# Patient Record
Sex: MALE | Race: WHITE | Employment: FULL TIME | URBAN - METROPOLITAN AREA
[De-identification: names, ages, dates, MRNs, and addresses within clinical notes are randomized per-mention and may not be internally consistent; named-entity substitution may affect disease eponyms.]

---

## 2021-03-10 ENCOUNTER — TRANSCRIBE ORDER (OUTPATIENT)
Dept: REGISTRATION | Age: 71
End: 2021-03-10

## 2021-03-10 ENCOUNTER — HOSPITAL ENCOUNTER (OUTPATIENT)
Dept: PREADMISSION TESTING | Age: 71
Discharge: HOME | End: 2021-03-10
Payer: MEDICARE

## 2021-03-10 DIAGNOSIS — N13.8 ENLARGED PROSTATE WITH URINARY OBSTRUCTION: Primary | ICD-10-CM

## 2021-03-10 DIAGNOSIS — N13.8 ENLARGED PROSTATE WITH URINARY OBSTRUCTION: ICD-10-CM

## 2021-03-10 DIAGNOSIS — N40.1 ENLARGED PROSTATE WITH URINARY OBSTRUCTION: ICD-10-CM

## 2021-03-10 DIAGNOSIS — N40.1 ENLARGED PROSTATE WITH URINARY OBSTRUCTION: Primary | ICD-10-CM

## 2021-03-10 LAB
ANION GAP SERPL CALC-SCNC: 4 MMOL/L (ref 3–18)
ATRIAL RATE: 52 BPM
BASOPHILS # BLD: 0 K/UL (ref 0–0.1)
BASOPHILS NFR BLD: 0 % (ref 0–2)
BUN SERPL-MCNC: 30 MG/DL (ref 7–18)
BUN/CREAT SERPL: 15 (ref 12–20)
CALCIUM SERPL-MCNC: 9.1 MG/DL (ref 8.5–10.1)
CALCULATED P AXIS, ECG09: 75 DEGREES
CALCULATED R AXIS, ECG10: -18 DEGREES
CALCULATED T AXIS, ECG11: 42 DEGREES
CHLORIDE SERPL-SCNC: 98 MMOL/L (ref 100–111)
CO2 SERPL-SCNC: 35 MMOL/L (ref 21–32)
CREAT SERPL-MCNC: 2.02 MG/DL (ref 0.6–1.3)
DIAGNOSIS, 93000: NORMAL
DIFFERENTIAL METHOD BLD: ABNORMAL
EOSINOPHIL # BLD: 0.1 K/UL (ref 0–0.4)
EOSINOPHIL NFR BLD: 1 % (ref 0–5)
ERYTHROCYTE [DISTWIDTH] IN BLOOD BY AUTOMATED COUNT: 14.6 % (ref 11.6–14.5)
GLUCOSE SERPL-MCNC: 73 MG/DL (ref 74–99)
HCT VFR BLD AUTO: 48.2 % (ref 36–48)
HGB BLD-MCNC: 15.7 G/DL (ref 13–16)
LYMPHOCYTES # BLD: 1.1 K/UL (ref 0.9–3.6)
LYMPHOCYTES NFR BLD: 20 % (ref 21–52)
MCH RBC QN AUTO: 32.6 PG (ref 24–34)
MCHC RBC AUTO-ENTMCNC: 32.6 G/DL (ref 31–37)
MCV RBC AUTO: 100.2 FL (ref 74–97)
MONOCYTES # BLD: 0.9 K/UL (ref 0.05–1.2)
MONOCYTES NFR BLD: 15 % (ref 3–10)
NEUTS SEG # BLD: 3.6 K/UL (ref 1.8–8)
NEUTS SEG NFR BLD: 64 % (ref 40–73)
P-R INTERVAL, ECG05: 178 MS
PLATELET # BLD AUTO: 201 K/UL (ref 135–420)
PMV BLD AUTO: 11.4 FL (ref 9.2–11.8)
POTASSIUM SERPL-SCNC: 3.9 MMOL/L (ref 3.5–5.5)
Q-T INTERVAL, ECG07: 396 MS
QRS DURATION, ECG06: 98 MS
QTC CALCULATION (BEZET), ECG08: 401 MS
RBC # BLD AUTO: 4.81 M/UL (ref 4.7–5.5)
SODIUM SERPL-SCNC: 137 MMOL/L (ref 136–145)
VENTRICULAR RATE, ECG03: 62 BPM
WBC # BLD AUTO: 5.7 K/UL (ref 4.6–13.2)

## 2021-03-10 PROCEDURE — U0003 INFECTIOUS AGENT DETECTION BY NUCLEIC ACID (DNA OR RNA); SEVERE ACUTE RESPIRATORY SYNDROME CORONAVIRUS 2 (SARS-COV-2) (CORONAVIRUS DISEASE [COVID-19]), AMPLIFIED PROBE TECHNIQUE, MAKING USE OF HIGH THROUGHPUT TECHNOLOGIES AS DESCRIBED BY CMS-2020-01-R: HCPCS

## 2021-03-10 PROCEDURE — 80048 BASIC METABOLIC PNL TOTAL CA: CPT

## 2021-03-10 PROCEDURE — 85025 COMPLETE CBC W/AUTO DIFF WBC: CPT

## 2021-03-10 PROCEDURE — 36415 COLL VENOUS BLD VENIPUNCTURE: CPT

## 2021-03-10 PROCEDURE — 93005 ELECTROCARDIOGRAM TRACING: CPT

## 2021-03-12 LAB — SARS-COV-2, NAA: NOT DETECTED

## 2021-03-16 ENCOUNTER — ANESTHESIA (OUTPATIENT)
Dept: SURGERY | Age: 71
End: 2021-03-16
Payer: MEDICARE

## 2021-03-16 ENCOUNTER — HOSPITAL ENCOUNTER (OUTPATIENT)
Age: 71
Setting detail: OUTPATIENT SURGERY
Discharge: HOME | End: 2021-03-16
Attending: UROLOGY | Admitting: UROLOGY
Payer: MEDICARE

## 2021-03-16 ENCOUNTER — ANESTHESIA EVENT (OUTPATIENT)
Dept: SURGERY | Age: 71
End: 2021-03-16
Payer: MEDICARE

## 2021-03-16 VITALS
WEIGHT: 178.13 LBS | OXYGEN SATURATION: 100 % | HEART RATE: 60 BPM | BODY MASS INDEX: 24.94 KG/M2 | HEIGHT: 71 IN | RESPIRATION RATE: 16 BRPM | TEMPERATURE: 98.2 F | SYSTOLIC BLOOD PRESSURE: 109 MMHG | DIASTOLIC BLOOD PRESSURE: 61 MMHG

## 2021-03-16 DIAGNOSIS — N13.8 ENLARGED PROSTATE WITH URINARY OBSTRUCTION: Primary | ICD-10-CM

## 2021-03-16 DIAGNOSIS — N40.1 ENLARGED PROSTATE WITH URINARY OBSTRUCTION: Primary | ICD-10-CM

## 2021-03-16 PROCEDURE — 77030040361 HC SLV COMPR DVT MDII -B: Performed by: UROLOGY

## 2021-03-16 PROCEDURE — 76210000026 HC REC RM PH II 1 TO 1.5 HR: Performed by: UROLOGY

## 2021-03-16 PROCEDURE — 74011000250 HC RX REV CODE- 250: Performed by: NURSE ANESTHETIST, CERTIFIED REGISTERED

## 2021-03-16 PROCEDURE — 77030034696 HC CATH URETH FOL 2W BARD -A: Performed by: UROLOGY

## 2021-03-16 PROCEDURE — 76060000031 HC ANESTHESIA FIRST 0.5 HR: Performed by: UROLOGY

## 2021-03-16 PROCEDURE — 74011250636 HC RX REV CODE- 250/636: Performed by: NURSE ANESTHETIST, CERTIFIED REGISTERED

## 2021-03-16 PROCEDURE — 74011250636 HC RX REV CODE- 250/636: Performed by: UROLOGY

## 2021-03-16 PROCEDURE — 77030020782 HC GWN BAIR PAWS FLX 3M -B: Performed by: UROLOGY

## 2021-03-16 PROCEDURE — 76210000063 HC OR PH I REC FIRST 0.5 HR: Performed by: UROLOGY

## 2021-03-16 PROCEDURE — 74011250637 HC RX REV CODE- 250/637: Performed by: ANESTHESIOLOGY

## 2021-03-16 PROCEDURE — 76010000154 HC OR TIME FIRST 0.5 HR: Performed by: UROLOGY

## 2021-03-16 PROCEDURE — 77030020268 HC MISC GENERAL SUPPLY: Performed by: UROLOGY

## 2021-03-16 PROCEDURE — 2709999900 HC NON-CHARGEABLE SUPPLY: Performed by: UROLOGY

## 2021-03-16 PROCEDURE — 74011250637 HC RX REV CODE- 250/637: Performed by: UROLOGY

## 2021-03-16 RX ORDER — SODIUM CHLORIDE, SODIUM LACTATE, POTASSIUM CHLORIDE, CALCIUM CHLORIDE 600; 310; 30; 20 MG/100ML; MG/100ML; MG/100ML; MG/100ML
125 INJECTION, SOLUTION INTRAVENOUS CONTINUOUS
Status: DISCONTINUED | OUTPATIENT
Start: 2021-03-16 | End: 2021-03-16 | Stop reason: HOSPADM

## 2021-03-16 RX ORDER — LEVOFLOXACIN 500 MG/1
500 TABLET, FILM COATED ORAL DAILY
Qty: 4 TAB | Refills: 0 | Status: SHIPPED | OUTPATIENT
Start: 2021-03-17 | End: 2021-03-21

## 2021-03-16 RX ORDER — LEVOFLOXACIN 5 MG/ML
500 INJECTION, SOLUTION INTRAVENOUS ONCE
Status: COMPLETED | OUTPATIENT
Start: 2021-03-16 | End: 2021-03-16

## 2021-03-16 RX ORDER — PROPOFOL 10 MG/ML
INJECTION, EMULSION INTRAVENOUS
Status: DISCONTINUED | OUTPATIENT
Start: 2021-03-16 | End: 2021-03-16 | Stop reason: HOSPADM

## 2021-03-16 RX ORDER — TRAMADOL HYDROCHLORIDE 50 MG/1
50 TABLET ORAL
Status: DISCONTINUED | OUTPATIENT
Start: 2021-03-16 | End: 2021-03-16 | Stop reason: HOSPADM

## 2021-03-16 RX ORDER — TRAMADOL HYDROCHLORIDE 50 MG/1
50 TABLET ORAL
Qty: 10 TAB | Refills: 0 | Status: SHIPPED | OUTPATIENT
Start: 2021-03-16 | End: 2021-03-19

## 2021-03-16 RX ORDER — LIDOCAINE HYDROCHLORIDE 20 MG/ML
INJECTION, SOLUTION EPIDURAL; INFILTRATION; INTRACAUDAL; PERINEURAL AS NEEDED
Status: DISCONTINUED | OUTPATIENT
Start: 2021-03-16 | End: 2021-03-16 | Stop reason: HOSPADM

## 2021-03-16 RX ORDER — KETAMINE HYDROCHLORIDE 10 MG/ML
INJECTION, SOLUTION INTRAMUSCULAR; INTRAVENOUS AS NEEDED
Status: DISCONTINUED | OUTPATIENT
Start: 2021-03-16 | End: 2021-03-16 | Stop reason: HOSPADM

## 2021-03-16 RX ORDER — MIDAZOLAM HYDROCHLORIDE 1 MG/ML
INJECTION, SOLUTION INTRAMUSCULAR; INTRAVENOUS AS NEEDED
Status: DISCONTINUED | OUTPATIENT
Start: 2021-03-16 | End: 2021-03-16 | Stop reason: HOSPADM

## 2021-03-16 RX ORDER — GLYCOPYRROLATE 0.2 MG/ML
INJECTION INTRAMUSCULAR; INTRAVENOUS AS NEEDED
Status: DISCONTINUED | OUTPATIENT
Start: 2021-03-16 | End: 2021-03-16 | Stop reason: HOSPADM

## 2021-03-16 RX ORDER — ATROPA BELLADONNA AND OPIUM 16.2; 3 MG/1; MG/1
SUPPOSITORY RECTAL AS NEEDED
Status: DISCONTINUED | OUTPATIENT
Start: 2021-03-16 | End: 2021-03-16 | Stop reason: HOSPADM

## 2021-03-16 RX ORDER — PROPOFOL 10 MG/ML
INJECTION, EMULSION INTRAVENOUS AS NEEDED
Status: DISCONTINUED | OUTPATIENT
Start: 2021-03-16 | End: 2021-03-16 | Stop reason: HOSPADM

## 2021-03-16 RX ORDER — ONDANSETRON 2 MG/ML
INJECTION INTRAMUSCULAR; INTRAVENOUS AS NEEDED
Status: DISCONTINUED | OUTPATIENT
Start: 2021-03-16 | End: 2021-03-16 | Stop reason: HOSPADM

## 2021-03-16 RX ADMIN — TRAMADOL HYDROCHLORIDE 50 MG: 50 TABLET, FILM COATED ORAL at 12:05

## 2021-03-16 RX ADMIN — KETAMINE HYDROCHLORIDE 10 MG: 10 INJECTION, SOLUTION INTRAMUSCULAR; INTRAVENOUS at 10:54

## 2021-03-16 RX ADMIN — PROPOFOL 50 MG: 10 INJECTION, EMULSION INTRAVENOUS at 10:56

## 2021-03-16 RX ADMIN — ONDANSETRON HYDROCHLORIDE 4 MG: 2 INJECTION INTRAMUSCULAR; INTRAVENOUS at 10:53

## 2021-03-16 RX ADMIN — PROPOFOL 50 MCG/KG/MIN: 10 INJECTION, EMULSION INTRAVENOUS at 10:49

## 2021-03-16 RX ADMIN — KETAMINE HYDROCHLORIDE 10 MG: 10 INJECTION, SOLUTION INTRAMUSCULAR; INTRAVENOUS at 10:49

## 2021-03-16 RX ADMIN — GLYCOPYRROLATE 0.2 MG: 0.2 INJECTION INTRAMUSCULAR; INTRAVENOUS at 10:52

## 2021-03-16 RX ADMIN — MIDAZOLAM 2 MG: 1 INJECTION INTRAMUSCULAR; INTRAVENOUS at 10:41

## 2021-03-16 RX ADMIN — LEVOFLOXACIN 500 MG: 5 INJECTION, SOLUTION INTRAVENOUS at 10:49

## 2021-03-16 RX ADMIN — KETAMINE HYDROCHLORIDE 10 MG: 10 INJECTION, SOLUTION INTRAMUSCULAR; INTRAVENOUS at 10:57

## 2021-03-16 RX ADMIN — LIDOCAINE HYDROCHLORIDE 40 MG: 20 INJECTION, SOLUTION EPIDURAL; INFILTRATION; INTRACAUDAL; PERINEURAL at 10:49

## 2021-03-16 RX ADMIN — PROPOFOL 50 MG: 10 INJECTION, EMULSION INTRAVENOUS at 10:54

## 2021-03-16 RX ADMIN — SODIUM CHLORIDE, SODIUM LACTATE, POTASSIUM CHLORIDE, AND CALCIUM CHLORIDE 125 ML/HR: 600; 310; 30; 20 INJECTION, SOLUTION INTRAVENOUS at 09:54

## 2021-03-16 NOTE — DISCHARGE INSTRUCTIONS
DISCHARGE SUMMARY from Nurse    Increase fluids today  Advance diet as tolerated  Call Dr. Laura Pulliam if you develop a fever greater than 101, chills, uncontrolled nausea and vomiting  Call Dr. Laura Pulliam if you have blood clots in your urine or have thick urine  Dr. Oksana Stark office will call you for follow up  PATIENT INSTRUCTIONS:    After general anesthesia or intravenous sedation, for 24 hours or while taking prescription Narcotics:  · Limit your activities  · Do not drive and operate hazardous machinery  · Do not make important personal or business decisions  · Do  not drink alcoholic beverages  · If you have not urinated within 8 hours after discharge, please contact your surgeon on call. Report the following to your surgeon:  · Excessive pain, swelling, redness or odor of or around the surgical area  · Temperature over 100.5  · Nausea and vomiting lasting longer than 4 hours or if unable to take medications  · Any signs of decreased circulation or nerve impairment to extremity: change in color, persistent  numbness, tingling, coldness or increase pain  · Any questions    What to do at Home:  Recommended activity: Activity as tolerated and no driving for today,     If you experience any of the following symptoms as noted above, please follow up with Dr. Laura Pulliam. *  Please give a list of your current medications to your Primary Care Provider. *  Please update this list whenever your medications are discontinued, doses are      changed, or new medications (including over-the-counter products) are added. *  Please carry medication information at all times in case of emergency situations. These are general instructions for a healthy lifestyle:    No smoking/ No tobacco products/ Avoid exposure to second hand smoke  Surgeon General's Warning:  Quitting smoking now greatly reduces serious risk to your health.     Obesity, smoking, and sedentary lifestyle greatly increases your risk for illness    A healthy diet, regular physical exercise & weight monitoring are important for maintaining a healthy lifestyle    You may be retaining fluid if you have a history of heart failure or if you experience any of the following symptoms:  Weight gain of 3 pounds or more overnight or 5 pounds in a week, increased swelling in our hands or feet or shortness of breath while lying flat in bed. Please call your doctor as soon as you notice any of these symptoms; do not wait until your next office visit. The discharge information has been reviewed with the patient and caregiver. The patient and caregiver verbalized understanding. Discharge medications reviewed with the patient and caregiver and appropriate educational materials and side effects teaching were provided. ___________________________________________________________________________________________________________________________________    Patient armband removed and shredded         10 Things to Do When You Have COVID-19    Stay home. Don't go to school, work, or public areas. And don't use public transportation, ride-shares, or taxis unless you have no choice. Leave your home only if you need to get medical care. But call the doctor's office first so they know you're coming. And wear a cloth face cover. Ask before leaving isolation. Talk with your doctor or other health professional about when it will be safe for you to leave isolation. Wear a cloth face cover when you are around other people. It can help stop the spread of the virus when you cough or sneeze. Limit contact with people in your home. If possible, stay in a separate bedroom and use a separate bathroom. Avoid contact with pets and other animals. If possible, have a friend or family member care for them while you're sick. Cover your mouth and nose with a tissue when you cough or sneeze. Then throw the tissue in the trash right away.      Wash your hands often, especially after you cough or sneeze. Use soap and water, and scrub for at least 20 seconds. If soap and water aren't available, use an alcohol-based hand . Don't share personal household items. These include bedding, towels, cups and glasses, and eating utensils. Clean and disinfect your home every day. Use household  or disinfectant wipes or sprays. Take special care to clean things that you grab with your hands. These include doorknobs, remote controls, phones, and handles on your refrigerator and microwave. And don't forget countertops, tabletops, bathrooms, and computer keyboards. Take acetaminophen (Tylenol) to relieve fever and body aches. Read and follow all instructions on the label. Current as of: December 18, 2020               Content Version: 12.7  © 9735-4137 [employer]. Care instructions adapted under license by [employer] (which disclaims liability or warranty for this information). If you have questions about a medical condition or this instruction, always ask your healthcare professional. [place] [address] any warranty or liability for your use of this information.

## 2021-03-16 NOTE — PERIOP NOTES
[facility] given [facility]. Patient sister on [facility] en route.

## 2021-03-16 NOTE — ANESTHESIA POSTPROCEDURE EVALUATION
Post-Anesthesia Evaluation & Assessment    Visit Vitals  /74   Pulse 68   Temp 36.8 °C (98.2 °F)   Resp 18   Ht 5' 11\" (1.803 m)   Wt 80.8 kg (178 lb 2 oz)   SpO2 100%   BMI 24.84 kg/m²       Nausea/Vomiting: no nausea and no vomiting    Post-operative hydration adequate. Pain score (VAS): 0    Mental status & Level of consciousness: alert and oriented x 3    Neurological status: moves all extremities, sensation grossly intact    Pulmonary status: airway patent, no supplemental oxygen required    Complications related to anesthesia: none    Patient has met all discharge requirements. Additional comments:  Procedure(s):  REZUM WATER VAPOR THERAPY OF PROSTATE. MAC    <BSHSIANPOST>    INITIAL Post-op Vital signs:   Vitals Value Taken Time   /74 03/16/21 1125   Temp 36.8 °C (98.2 °F) 03/16/21 1111   Pulse 66 03/16/21 1130   Resp 19 03/16/21 1130   SpO2 99 % 03/16/21 1130   Vitals shown include unvalidated device data.

## 2021-03-16 NOTE — PERIOP NOTES
Reviewed PTA medication list with patient/caregiver and patient/caregiver denies any additional medications. Patient admits to having a responsible adult care for them at home for at least 24 hours after surgery. Patient encouraged to use gown warming system and informed that using said warming gown to regulate body temperature prior to a procedure has been shown to help reduce the risks of blood clots and infection. Patient's pharmacy of choice verified and documented in PTA medication section. Dual skin assessment & fall risk band verification completed with Zeina Sprague.

## 2021-03-16 NOTE — OP NOTES
Date of Procedure: [id number]     Pre-Op Diagnosis: BPH W/OBSTRUCTION     Post-Op Diagnosis: Same as preoperative diagnosis.        Procedure(s):  CYSTOSCOPY, REZUM WATER VAPOR THERAPY OF PROSTATE     Surgeon(s):  Nellie Montanez MD     Surgical Assistant: None     Anesthesia: MAC     Estimated Blood Loss (mL): Minimal     Complications: None     Specimens: None     Implants: None     Drains: 20 fr cobb catheter     Findings: Grade 2 bladder trabeculations. Moderate lateral lobes and elevated bladder neck. A total of 3 injections were used         Procedure:     The patient verbalized an understanding of the technology and procedure, he wishes to proceed. Patient informed of risks and benefits of procedure. Transurethral needle ablation with steam performed after informed consent given. Yanet Mata was administered a MAC anesthetic and IV antibiotics     Radiofrequency was then used to create thermal energy to selectively ablate prostate tissue. Targeted treatments delivered into the prostate utilizing radiofrequency power to form sterile water vapor at 9 o'clock position(s) were treated with Radio Frequency.  Water Vapor approximately 1 cm from the bladder neck on each lateral lobe. One treatment at the median lobe  / elevated bladder neck --  approx 0.75 cm from bladder neck at a 45-degree angle -- for a total of 3 treatments.      20 FR cobb catheter was placed into the bladder with clear urine returned. Patient was then awoken from anesthesia and sent to the PACU.

## 2021-03-16 NOTE — H&P
[facility] [address] [facility]  [address] [address]  Tel: [phone]  Fax: [phone]      Patient: Abelino Gan  YOB: 1950          Assessment/Plan  # Detail Type Description    1. Assessment BPH w/ lower urinary tract symptom (N40.1). Patient Plan He's improved on Flomax but the side effects are worse. We discussed trial of Uroxatral vs Rezum vs laser vaporization. He wishes to proceed with Rezum. Risk of bleeding, infection, worsening of irritative sx's and the fact that it can take a few months to have the full effect of tx were discussed. He will proceed. 2. Assessment Poor urinary stream (R39.12). Patient Plan This is improved on Flomax. He's just barely to the normal level. 3. Assessment Feeling of incomplete bladder emptying (R39.14). Patient Plan His PVR is still high at 140ml but he is improved from where he's been in the past.         4. Assessment Erectile dysfunction due to arterial insufficiency (N52.01). Patient Plan He's doing worse. He states that his liver transplant specialist told him he is fine to take Cialis. So he's given an Rx for 20mg as needed. 5. 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 Patient Plan He still gets his TRT in [place]. This 78year old male presents for BPH and erectile dysfunction uro. History of Present Illness:  1. BPH   Onset was gradual. Severity level is moderate. It occurs daily. The problem is improving. Associated symptoms include incomplete emptying, nocturia (1 times per night), slow stream and urgency. Pertinent negatives include chills, constipation, fever, urinary incontinence and dribbling. Additional information: He does take testopel at [facility]. He does have a liver transplant from 2017. 2/3/2021 -- He has improved urine flow on flomax but he is unhappy with the retrograde ejaculation. No dysuria.   no painful Writer paged MD Gee regarding patient had bowel movement and noted to have blood in toilet bowel, states feels a little light headed, getting NS Bolus 500mL. BP Stable 1156/58 and last H&H, 8.6, 26.7. call back number provided.      urinarytion. .      2.  erectile dysfunction uro   The symptoms began gradually, have been moderate and are worse. The patient is here today for a follow up visit. The patient states he does have difficultly attaining an erection and has difficulty maintaining an erection. Pertinent history does not include diabetes or neurologic disease. He denies depression. Additional information: He has not tried meds to this point. He states that he is fine form a liver transplant standpoint and liver function is good. PAST MEDICAL/SURGICAL HISTORY   (Reviewed, updated)    Disease/disorder Onset Date Management Date Comments     liver transplant       face plastic surgery       spinal fusion           PROBLEM LIST:   Problem List reviewed. Medications (active prior to today)  Medication Name Sig Description Start Date Stop Date Refilled Rx Elsewhere   tacrolimus 0.5 mg capsule take 1 microgram by oral route 3 times every day 08/24/2020   N   mycophenolate mofetil 250 mg capsule take 1 capsule by oral route 4 times every day 08/24/2020   N   Norvasc 10 mg tablet take 1 tablet by oral route  every day 08/24/2020   N   chlorthalidone 25 mg tablet take 1 tablet by oral route 2 times every day 08/24/2020   N   nadolol 20 mg tablet take 1 tablet by oral route  every day 08/24/2020   N   entecavir 0.5 mg tablet take 1 tablet by oral route  every other day.  08/24/2020   N   Multiple Vitamins tablet take 1 tablet by oral route  every day with food 08/24/2020   N   magnesium 200 mg tablet take 2 tablet by oral route  every day 08/24/2020   N   finasteride 1 mg tablet take 1 tablet by oral route  every day 08/24/2020   N   bupropion HCl  mg 24 hr tablet, extended release take 1 tablet by oral route  every day 10/14/2020   N   Descovy 200 mg-25 mg tablet take 1 tablet by oral route  every day 11/11/2020   N   tamsulosin 0.4 mg capsule take 1 capsule by oral route  every day 1/2 hour following the same meal each day 12/02/2020   N     Medication Reconciliation  Medications reconciled today. Medication Reviewed  Adherence Medication Name Sig Desc Elsewhere Status   taking as directed tacrolimus 0.5 mg capsule take 1 microgram by oral route 3 times every day N Verified   taking as directed Norvasc 10 mg tablet take 1 tablet by oral route  every day N Verified   taking as directed mycophenolate mofetil 250 mg capsule take 1 capsule by oral route 4 times every day N Verified   taking as directed nadolol 20 mg tablet take 1 tablet by oral route  every day N Verified   taking as directed chlorthalidone 25 mg tablet take 1 tablet by oral route 2 times every day N Verified   taking as directed Multiple Vitamins tablet take 1 tablet by oral route  every day with food N Verified   taking as directed entecavir 0.5 mg tablet take 1 tablet by oral route  every other day. N Verified   taking as directed magnesium 200 mg tablet take 2 tablet by oral route  every day N Verified   taking as directed finasteride 1 mg tablet take 1 tablet by oral route  every day N Verified   taking as directed bupropion HCl  mg 24 hr tablet, extended release take 1 tablet by oral route  every day N Verified   taking as directed Descovy 200 mg-25 mg tablet take 1 tablet by oral route  every day N Verified   taking as directed tamsulosin 0.4 mg capsule take 1 capsule by oral route  every day 1/2 hour following the same meal each day N Verified     Allergies  Ingredient Reaction (Severity) Medication Name Comment   LISINOPRIL swollen lips       Reviewed, no changes. Family History  (Reviewed, updated)      Social History:  (Reviewed, updated)  Tobacco use reviewed. Preferred language is [place]. MARITAL STATUS/FAMILY/SOCIAL SUPPORT  Marital status: Single   Tobacco use status: Current non-smoker. Smoking status: Never smoker. TOBACCO SCREENING:  Patient has never used tobacco. Patient has not used tobacco in the last 30 days.  Patient has not used smokeless tobacco in the last 30 days. SMOKING STATUS  Type Smoking Status Usage Per Day Years Used Pack Years Total Pack Years    Never smoker         ALCOHOL  There is no history of alcohol use. CAFFEINE  The patient does not use caffeine. Review of Systems  System Neg/Pos Details   Constitutional Negative Chills and Fever. ENMT Negative Ear infections and Sore throat. Eyes Negative Blurred vision, Double vision and Eye pain. Respiratory Negative Asthma, Chronic cough, Dyspnea and Wheezing. Cardio Negative Chest pain. GI Negative Constipation, Decreased appetite, Diarrhea, Nausea and Vomiting.  Positive Incomplete emptying, Nocturia, Slow stream, Urgency.  Negative Urinary dribbling and Urinary incontinence. Endocrine Negative Cold intolerance, Heat intolerance, Increased thirst and Weight loss. Neuro Negative Headache and Tremors. Psych Negative Anxiety and Depression. Integumentary Negative Itching skin and Rash. MS Negative Back pain and Joint pain. Hema/Lymph Negative Easy bleeding. Reproductive Negative Sexual dysfunction. Vital Signs   Height  Time ft in cm Last Measured Height Position   2:46 PM 5.0 11.00 180.34 08/24/2020 Standing   Weight/BSA/BMI  Time lb oz kg Context BMI kg/m2 BSA m2   2:46 .00  79.379  24.41    Measured By  Time Measured by   2:46 PM Florence Quan     Physical Exam  Exam Findings Details   Constitutional Normal Well developed. Neck Exam Normal Inspection - Normal.   Respiratory Normal Inspection - Normal.   Abdomen Normal No abdominal tenderness. Genitourinary Normal No CVA tenderness. No suprapubic tenderness. Extremity Normal No edema. Psychiatric Normal Orientation - Oriented to time, place, person & situation. Appropriate mood and affect.        Immunizations Entered by History    Date Immunization   6/11/2013 12:00:00 AM tetanus toxoid, reduced diphtheria toxoid, and acellular pertussis vaccine, adsorbed   6/11/2013 12:00:00 AM pneumococcal polysaccharide vaccine, 23 valent       Procedures:      Uroflow:  Feeling of incomplete bladder emptying R39.14. Consent was obtained. The procedure and risks were explained in detail. Questions were encouraged and answered. Patient was prepped and draped in the usual fashion. Procedure: Total volume: 432ml. Flow time: 52sec. Peak flow: 20ml. Void time: 52sec. Average flow: 8m/sec. Time to peak: 20sec. Sonographic residual urine: 140mL. Time after void: 5 Minutes. Comments:. Physician: Freddy Scott MD. Date: 02/03/2021. Time: 2:45 PM.  Post procedure: Instructions:. Patient Education  # Patient Education   1. Benign Prostatic Hyperplasia: Care In~     Medications (added, continued, or stopped today)  Start Date Medication Directions PRN Status PRN Reason Instruction Stop Date   10/14/2020 bupropion HCl  mg 24 hr tablet, extended release take 1 tablet by oral route  every day N      08/24/2020 chlorthalidone 25 mg tablet take 1 tablet by oral route 2 times every day N      11/11/2020 Descovy 200 mg-25 mg tablet take 1 tablet by oral route  every day N      08/24/2020 entecavir 0.5 mg tablet take 1 tablet by oral route  every other day.  N      08/24/2020 finasteride 1 mg tablet take 1 tablet by oral route  every day N      08/24/2020 magnesium 200 mg tablet take 2 tablet by oral route  every day N      08/24/2020 Multiple Vitamins tablet take 1 tablet by oral route  every day with food N      08/24/2020 mycophenolate mofetil 250 mg capsule take 1 capsule by oral route 4 times every day N      08/24/2020 nadolol 20 mg tablet take 1 tablet by oral route  every day N      08/24/2020 Norvasc 10 mg tablet take 1 tablet by oral route  every day N      08/24/2020 tacrolimus 0.5 mg capsule take 1 microgram by oral route 3 times every day N      02/03/2021 tadalafil 20 mg tablet take 1 tablet by oral route  every day N      12/02/2020 tamsulosin 0.4 mg capsule take 1 capsule by oral route  every day 1/2 hour following the same meal each day N      Active Patient Care Team Members    Name Contact Agency Type Support Role Relationship Active Date Inactive Date Specialty   Kaity Tierney   Patient provider PCP   Internal Medicine   Chloé Blanca   Emergency Contact Other          Provider:     Jolly Alarcon MD

## 2021-03-16 NOTE — ANESTHESIA PREPROCEDURE EVALUATION
Relevant Problems   No relevant active problems       Anesthetic History   No history of anesthetic complications            Review of Systems / Medical History  Patient summary reviewed, nursing notes reviewed and pertinent labs reviewed    Pulmonary  Within defined limits            Pertinent negatives: No sleep apnea and smoker     Neuro/Psych   Within defined limits           Cardiovascular    Hypertension              Exercise tolerance: >4 METS     GI/Hepatic/Renal     GERD      Liver disease (s/p liver Tx 2017 on immunosuppressants)     Endo/Other          Pertinent negatives: No diabetes   Other Findings              Physical Exam    Airway  Mallampati: I  TM Distance: 4 - 6 cm  Neck ROM: normal range of motion        Cardiovascular    Rhythm: regular  Rate: normal         Dental  No notable dental hx       Pulmonary  Breath sounds clear to auscultation               Abdominal  GI exam deferred       Other Findings            Anesthetic Plan    ASA: 3  Anesthesia type: general          Induction: Intravenous  Anesthetic plan and risks discussed with: Patient

## 2021-03-16 NOTE — PERIOP NOTES
Patient had very little catheter externally and was  from leg bag upon arrival in phase 2.   Dr. Gemini De Jesus ok with placing extension on catheter to prevent separation

## 2022-03-18 PROBLEM — N40.1 ENLARGED PROSTATE WITH URINARY OBSTRUCTION: Status: ACTIVE | Noted: 2021-03-16

## 2022-03-18 PROBLEM — N13.8 ENLARGED PROSTATE WITH URINARY OBSTRUCTION: Status: ACTIVE | Noted: 2021-03-16

## 2022-06-13 ENCOUNTER — HOSPITAL ENCOUNTER (OUTPATIENT)
Age: 72
Setting detail: OUTPATIENT SURGERY
Discharge: HOME | End: 2022-06-13
Attending: INTERNAL MEDICINE | Admitting: INTERNAL MEDICINE
Payer: MEDICARE

## 2022-06-13 VITALS
HEIGHT: 73 IN | DIASTOLIC BLOOD PRESSURE: 69 MMHG | RESPIRATION RATE: 17 BRPM | TEMPERATURE: 97.5 F | BODY MASS INDEX: 21.71 KG/M2 | HEART RATE: 75 BPM | OXYGEN SATURATION: 97 % | WEIGHT: 163.8 LBS | SYSTOLIC BLOOD PRESSURE: 116 MMHG

## 2022-06-13 PROCEDURE — G0500 MOD SEDAT ENDO SERVICE >5YRS: HCPCS | Performed by: INTERNAL MEDICINE

## 2022-06-13 PROCEDURE — 2709999900 HC NON-CHARGEABLE SUPPLY: Performed by: INTERNAL MEDICINE

## 2022-06-13 PROCEDURE — 77030040361 HC SLV COMPR DVT MDII -B: Performed by: INTERNAL MEDICINE

## 2022-06-13 PROCEDURE — 74011250636 HC RX REV CODE- 250/636: Performed by: INTERNAL MEDICINE

## 2022-06-13 PROCEDURE — 77030039961 HC KT CUST COLON BSC -D: Performed by: INTERNAL MEDICINE

## 2022-06-13 PROCEDURE — 76040000007: Performed by: INTERNAL MEDICINE

## 2022-06-13 RX ORDER — SODIUM CHLORIDE 0.9 % (FLUSH) 0.9 %
5-40 SYRINGE (ML) INJECTION AS NEEDED
Status: CANCELLED | OUTPATIENT
Start: 2022-06-13

## 2022-06-13 RX ORDER — FUROSEMIDE 40 MG/1
40 TABLET ORAL 2 TIMES DAILY
Status: ON HOLD | COMMUNITY
End: 2022-06-13

## 2022-06-13 RX ORDER — TACROLIMUS 1 MG/1
1 CAPSULE ORAL 3 TIMES DAILY
Status: ON HOLD | COMMUNITY
End: 2022-06-13

## 2022-06-13 RX ORDER — FINASTERIDE 1 MG/1
TABLET, FILM COATED ORAL
COMMUNITY
Start: 2021-09-10

## 2022-06-13 RX ORDER — ATROPINE SULFATE 0.1 MG/ML
0.5 INJECTION INTRAVENOUS
Status: CANCELLED | OUTPATIENT
Start: 2022-06-13 | End: 2022-06-14

## 2022-06-13 RX ORDER — FLUMAZENIL 0.1 MG/ML
0.2 INJECTION INTRAVENOUS
Status: DISCONTINUED | OUTPATIENT
Start: 2022-06-13 | End: 2022-06-13 | Stop reason: HOSPADM

## 2022-06-13 RX ORDER — VITAMIN E 1000 UNIT
1000 CAPSULE ORAL DAILY
COMMUNITY

## 2022-06-13 RX ORDER — GABAPENTIN 300 MG/1
1 CAPSULE ORAL 2 TIMES DAILY
Status: ON HOLD | COMMUNITY
Start: 2022-03-08 | End: 2022-06-13

## 2022-06-13 RX ORDER — MYCOPHENOLATE MOFETIL 250 MG/1
CAPSULE ORAL 2 TIMES DAILY
Status: ON HOLD | COMMUNITY
End: 2022-06-13

## 2022-06-13 RX ORDER — ALLOPURINOL 100 MG/1
1 TABLET ORAL DAILY
Status: ON HOLD | COMMUNITY
Start: 2022-03-15 | End: 2022-06-13

## 2022-06-13 RX ORDER — GABAPENTIN 100 MG/1
100 CAPSULE ORAL
Status: ON HOLD | COMMUNITY
Start: 2022-01-04 | End: 2022-06-13

## 2022-06-13 RX ORDER — FENTANYL CITRATE 50 UG/ML
INJECTION, SOLUTION INTRAMUSCULAR; INTRAVENOUS AS NEEDED
Status: DISCONTINUED | OUTPATIENT
Start: 2022-06-13 | End: 2022-06-13 | Stop reason: HOSPADM

## 2022-06-13 RX ORDER — PHENTERMINE HYDROCHLORIDE 37.5 MG/1
1 CAPSULE ORAL
Status: ON HOLD | COMMUNITY
Start: 2021-09-10 | End: 2022-06-13

## 2022-06-13 RX ORDER — MIDAZOLAM HYDROCHLORIDE 1 MG/ML
.25-5 INJECTION, SOLUTION INTRAMUSCULAR; INTRAVENOUS
Status: DISCONTINUED | OUTPATIENT
Start: 2022-06-13 | End: 2022-06-13 | Stop reason: HOSPADM

## 2022-06-13 RX ORDER — GABAPENTIN 300 MG/1
CAPSULE ORAL
COMMUNITY
Start: 2022-03-08

## 2022-06-13 RX ORDER — OMEPRAZOLE 40 MG/1
CAPSULE, DELAYED RELEASE ORAL
COMMUNITY
Start: 2022-06-06

## 2022-06-13 RX ORDER — SODIUM CHLORIDE 9 MG/ML
1000 INJECTION, SOLUTION INTRAVENOUS CONTINUOUS
Status: DISCONTINUED | OUTPATIENT
Start: 2022-06-13 | End: 2022-06-13 | Stop reason: HOSPADM

## 2022-06-13 RX ORDER — DEXTROMETHORPHAN/PSEUDOEPHED 2.5-7.5/.8
1.2 DROPS ORAL
Status: CANCELLED | OUTPATIENT
Start: 2022-06-13

## 2022-06-13 RX ORDER — NALOXONE HYDROCHLORIDE 0.4 MG/ML
0.4 INJECTION, SOLUTION INTRAMUSCULAR; INTRAVENOUS; SUBCUTANEOUS
Status: DISCONTINUED | OUTPATIENT
Start: 2022-06-13 | End: 2022-06-13 | Stop reason: HOSPADM

## 2022-06-13 RX ORDER — AMLODIPINE BESYLATE 10 MG/1
10 TABLET ORAL DAILY
Status: ON HOLD | COMMUNITY
End: 2022-06-13

## 2022-06-13 RX ORDER — EPINEPHRINE 0.1 MG/ML
1 INJECTION INTRACARDIAC; INTRAVENOUS
Status: CANCELLED | OUTPATIENT
Start: 2022-06-13 | End: 2022-06-14

## 2022-06-13 RX ORDER — SODIUM CHLORIDE 0.9 % (FLUSH) 0.9 %
5-40 SYRINGE (ML) INJECTION EVERY 8 HOURS
Status: CANCELLED | OUTPATIENT
Start: 2022-06-13

## 2022-06-13 RX ORDER — FENTANYL CITRATE 50 UG/ML
100 INJECTION, SOLUTION INTRAMUSCULAR; INTRAVENOUS
Status: CANCELLED | OUTPATIENT
Start: 2022-06-13 | End: 2022-06-13

## 2022-06-13 RX ORDER — METOPROLOL SUCCINATE 25 MG/1
TABLET, EXTENDED RELEASE ORAL
COMMUNITY
Start: 2022-04-16

## 2022-06-13 RX ORDER — PHENTERMINE HYDROCHLORIDE 37.5 MG/1
37.5 TABLET ORAL DAILY
COMMUNITY
Start: 2022-04-08

## 2022-06-13 RX ORDER — DIPHENHYDRAMINE HYDROCHLORIDE 50 MG/ML
50 INJECTION, SOLUTION INTRAMUSCULAR; INTRAVENOUS ONCE
Status: CANCELLED | OUTPATIENT
Start: 2022-06-13 | End: 2022-06-13

## 2022-06-13 RX ORDER — ALLOPURINOL 100 MG/1
TABLET ORAL
COMMUNITY
Start: 2022-03-15

## 2022-06-13 RX ORDER — FUROSEMIDE 20 MG/1
TABLET ORAL
COMMUNITY
Start: 2022-04-28

## 2022-06-13 RX ORDER — TRIAMCINOLONE ACETONIDE 1 MG/G
OINTMENT TOPICAL 2 TIMES DAILY
COMMUNITY
Start: 2021-12-17 | End: 2022-12-17

## 2022-06-13 RX ADMIN — SODIUM CHLORIDE 1000 ML: 9 INJECTION, SOLUTION INTRAVENOUS at 10:20

## 2022-06-13 NOTE — DISCHARGE INSTRUCTIONS
Acquanetta Burkitt  [id number]  1950    EGD DISCHARGE INSTRUCTIONS  Discomfort:  Sore throat- throat lozenges or warm salt water gargle  redness at IV site- apply warm compress to area; if redness or soreness persist- contact your physician  Gaseous discomfort- walking, belching will help relieve any discomfort  You may not operate a vehicle until the next day  You may not engage in an occupation involving machinery or appliances until the next day  You may not drink alcoholic beverages until the next day  Avoid making any critical decisions for at least 24 hour    DIET   You may not resume your regular diet. Antireflux diet. ACTIVITY  You may not resume your normal daily activities   Spend the remainder of the day resting -  avoid any strenuous activity. CALL M.D. ANY SIGN OF   Increasing pain, nausea, vomiting  Abdominal distension (swelling)  New increased bleeding (oral or rectal)  Fever (chills)  Pain in chest area  Bloody discharge from nose or mouth  Shortness of breath     You may not take any Advil, Aspirin, Ibuprofen, Motrin, Aleve, or Goodys  ONLY  Tylenol as needed for pain. Follow-up Instructions: Follow-up in the office as scheduled or make a follow-up appointment in 2 weeks. Gerardo Laurent MD  June 13, 2022     DISCHARGE SUMMARY from Nurse    PATIENT INSTRUCTIONS:    After general anesthesia or intravenous sedation, for 24 hours or while taking prescription Narcotics:  · Limit your activities  · Do not drive and operate hazardous machinery  · Do not make important personal or business decisions  · Do  not drink alcoholic beverages  · If you have not urinated within 8 hours after discharge, please contact your surgeon on call.     Report the following to your surgeon:  · Excessive pain, swelling, redness or odor of or around the surgical area  · Temperature over 100.5  · Nausea and vomiting lasting longer than 4 hours or if unable to take medications  · Any signs of decreased circulation or nerve impairment to extremity: change in color, persistent  numbness, tingling, coldness or increase pain  · Any questions    What to do at Home:  Recommended activity: NO DRIVING/NO DRINKING/ NO RECREATIONAL DRUG USE/ NO LEGAL DECISION MAKING    If you experience any of the following symptoms AS ABOVE, please follow up with Dr. Eulogio Mazariegos. *  Please give a list of your current medications to your Primary Care Provider. *  Please update this list whenever your medications are discontinued, doses are      changed, or new medications (including over-the-counter products) are added. *  Please carry medication information at all times in case of emergency situations. These are general instructions for a healthy lifestyle:    No smoking/ No tobacco products/ Avoid exposure to second hand smoke  Surgeon General's Warning:  Quitting smoking now greatly reduces serious risk to your health. Obesity, smoking, and sedentary lifestyle greatly increases your risk for illness    A healthy diet, regular physical exercise & weight monitoring are important for maintaining a healthy lifestyle    You may be retaining fluid if you have a history of heart failure or if you experience any of the following symptoms:  Weight gain of 3 pounds or more overnight or 5 pounds in a week, increased swelling in our hands or feet or shortness of breath while lying flat in bed. Please call your doctor as soon as you notice any of these symptoms; do not wait until your next office visit. The discharge information has been reviewed with the patient and caregiver. The patient and caregiver verbalized understanding. Discharge medications reviewed with the patient and caregiver and appropriate educational materials and side effects teaching were provided.   Patient armband removed and shredded    ___________________________________________________________________________________________________________________________________

## 2022-06-13 NOTE — H&P
Assessment/Plan  # Detail Type Description    1. Assessment Hiccup (R06.6). Patient Plan he had liver transplant in 2017 for hep c. presently cured. 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 he felt better with Gabapentin. he had bleeding esophageal varices. he had 25 banding procedure  felt better disappeared x 2 weeks because of stress reduction as he was severely stressed before. every where but last tuesday it came back progressively got worse. last night he took Gabapentin from her mother's and made him feel better. he has recent severe GERD x 6 months he been on omeprazole 20 x 4 to 6 months and then moved to 40 mg for 2 months and working well.  he has dysphagia to solids for one a year mostly to dry rice and meat. he had to be very careful. he has regurgitation. lately if he has been getting severe belching after a large meal. weight has been stable. 170 lbs. no smoke or drink. he was alcoholic and drug addict but stopped in . he has 2 bm / week with fiber. he had a negative colonoscopy in 2017 by Dr Salazar Willson. he doesn't eat much only one or at most 2 small meals per day. Aunt had brain cancer. father  from heart. mother had multiple CVA  he had lumbar spinal fusion in . he takes occasional pain killer. no NSAID's  he has gout in his right foot and ankle every 6 weeks he gets an attack. on Colchicine he is limping  so for sure we need to do an EGD soon preferably under mac because of hx of tolerance. he has probably severe scarring in his esophagus from banding and chronic GERD but we need to r/o ca? ? I explained the procedure of upper endoscopy or EGD, the alternative and the risks involved which include but not limited to aspiration, bleeding perforation or reaction to sedation. He was agreeable to this.          2. Assessment Constipation (K59.00). Patient Plan he doesn't see him self constipated but he has 2 bm/ week. eats very little  already had a negative colonoscopy in 2017 by Dr Salazar Willson report unavailable              This 73year old  patient was referred by Lin Mcgowan. This 73year old male presents for Hiccup. History of Present Illness  1. Hiccup   The symptoms began 6 months ago. The symptoms are reported as being severe. The symptoms occur constantly. Aggravating factors include Stress. Relieving factors include gabapentin. The patient states the symptoms are chronic. Pt stated that his hiccup stop for 2 weeks came back yesterday . Pt had liver transplant 2017 ,had esophogeal varices. Past Medical/Surgical History   (Detailed)  Disease/disorder Onset Date Management Date Comments     cysto,  REZUM treatment of prostate 03/16/2021      liver transplant       face plastic surgery       spinal fusion           Family History   (Detailed)      Social History  (Detailed)  Tobacco use reviewed. Preferred language is [place]. Marital Status/Family/Social Support  Marital status: Single     Tobacco use status: Current non-smoker. Smoking status: Never smoker. Tobacco Screening  Patient has never used tobacco. Patient has not used tobacco in the last 30 days. Patient has not used smokeless tobacco in the last 30 days. Smoking Status  Type Smoking Status Usage Per Day Years Used Pack Years Total Pack Years    Never smoker         Alcohol  There is no history of alcohol use. Caffeine  The patient does not use caffeine. Lifestyle  Diet  , high cholesterol. Medications (active prior to today)  Medication Instructions Start Date Stop Date Refilled Elsewhere   entecavir 0.5 mg tablet take 1 tablet by oral route  every other day.  08/24/2020   N   magnesium 200 mg tablet take 2 tablet by oral route  every day 07/20/2021 07/20/2021 N   Multiple Vitamins tablet take 1 tablet by oral route  every day with food 07/20/2021 07/20/2021 N   nadolol 20 mg tablet take 1 tablet by oral route  every day 07/20/2021 07/20/2021 N   Norvasc 10 mg tablet take 1 tablet by oral route  every day 07/20/2021 07/20/2021 N   mycophenolate mofetil 250 mg capsule take 1 capsule by oral route 2 times every day 07/20/2021 07/20/2021 N   tacrolimus 0.5 mg capsule, immediate-release take 1 microgram by oral route 2 times every day 07/21/2021 07/21/2021 N   finasteride 1 mg tablet take 1 tablet by oral route  every day 09/10/2021   N   Vitamin C 1,000 mg tablet  09/10/2021   N   Vitamin D3 125 mcg (5,000 unit) tablet  09/10/2021   N   allopurinol 100 mg tablet take 1 tablet by oral route  every day 12/14/2021   N   furosemide 20 mg tablet take 1 tablet by oral route  every day 12/14/2021   N   Descovy 200 mg-25 mg tablet take 1 tablet by oral route  every day 12/14/2021 02/24/2022  N   SILDENAFIL 100 MG TABLET TAKE ONE TABLET BY MOUTH DAILY AS NEEDED APPROXIMATELY 1 HOUR BEFORE SEXUAL ACTIVITY 12/20/2021 12/20/2021 N   pantoprazole 40 mg tablet,delayed release take 1 tablet by oral route  every day 01/06/2022   N   colchicine 0.6 mg tablet take 2 tablets by oral route once then take 1 tablet by  2 times a day for 7 days as needed for Gout flair. 01/06/2022   N   Klonopin 1 mg tablet take 1 tablet po BID prn hiccups, no driving while on medication 01/24/2022 01/24/2022 N   testosterone 50 mg/5 gram (1 %) transdermal gel apply 1 Tube by Topical route  every day 02/07/2022 02/07/2022 N       Medication Reconciliation  Medications reconciled today. Medications (Added, Continued or Stopped today)  Start Date Medication Directions PRN Status PRN Reason Instruction Stop Date   12/14/2021 allopurinol 100 mg tablet take 1 tablet by oral route  every day N      01/06/2022 colchicine 0.6 mg tablet take 2 tablets by oral route once then take 1 tablet by  2 times a day for 7 days as needed for Gout flair.  N      12/14/2021 Descovy 200 mg-25 mg tablet take 1 tablet by oral route  every day N   02/24/2022 08/24/2020 entecavir 0.5 mg tablet take 1 tablet by oral route  every other day. N      09/10/2021 finasteride 1 mg tablet take 1 tablet by oral route  every day N      12/14/2021 furosemide 20 mg tablet take 1 tablet by oral route  every day N      01/24/2022 Klonopin 1 mg tablet take 1 tablet po BID prn hiccups, no driving while on medication N      07/20/2021 magnesium 200 mg tablet take 2 tablet by oral route  every day N      07/20/2021 Multiple Vitamins tablet take 1 tablet by oral route  every day with food N      07/20/2021 mycophenolate mofetil 250 mg capsule take 1 capsule by oral route 2 times every day N      07/20/2021 nadolol 20 mg tablet take 1 tablet by oral route  every day N      07/20/2021 Norvasc 10 mg tablet take 1 tablet by oral route  every day N      01/06/2022 pantoprazole 40 mg tablet,delayed release take 1 tablet by oral route  every day N      12/20/2021 SILDENAFIL 100 MG TABLET TAKE ONE TABLET BY MOUTH DAILY AS NEEDED APPROXIMATELY 1 HOUR BEFORE SEXUAL ACTIVITY N      07/21/2021 tacrolimus 0.5 mg capsule, immediate-release take 1 microgram by oral route 2 times every day N      02/07/2022 testosterone 50 mg/5 gram (1 %) transdermal gel apply 1 Tube by Topical route  every day N      09/10/2021 Vitamin C 1,000 mg tablet  N      09/10/2021 Vitamin D3 125 mcg (5,000 unit) tablet  N        Allergies  Ingredient Reaction (Severity) Medication Name Comment   LISINOPRIL swollen lips       Reviewed, no changes. Orders  Status Lab Order Time Frame Comments   result received Comp Metabolic Panel (14) AU     result received Lipid Panel calc LDL     result received Prostate-Specific, Ag Serum     result received CBC With Differential/Platelet     result received Urinalysis, Routine With Reflx     ordered US Carotid     ordered Ct, Ng, Trich vag by VERONIKA     ordered Referrals: Behavioral Health. Margarita Snyder. Evaluate and treat  [place] only. patient is able to wait for appt. result received HIV 1/0/2 Ag/Ab with Reflex     result received HCV Ab w/Rflx to Verification     ordered Referrals: Urology. Evaluate and treat  verify BPH and discuss options   ordered HCV RNA VERONIKA Qualitative     ordered Follow-up:     ordered CBC With Differential/Platelet     ordered Hemoglobin A1c     ordered Comp Metabolic Panel (14) AU     ordered Lipid Panel calc LDL     ordered Uric Acid, Serum AU     ordered Urinalysis, Routine With Reflx     ordered Urine Microalb/Creat ratio     ordered Prostate-Specific, Ag Serum     ordered CBC With Differential/Platelet     ordered Comp Metabolic Panel (14) AU     ordered Lipid Panel calc LDL     ordered Prostate-Specific, Ag Serum     ordered Testosterone, Serum     ordered Hemoglobin     ordered Referrals: Gastroenterology. Iris Meraz MD. Evaluate and treat         Review of Systems  System Neg/Pos Details   Constitutional Negative Chills, Fever, Malaise and Weight loss. ENMT Negative Ear infections, Nasal congestion, Sinus Infection and Sore throat. Eyes Negative Double vision and Eye pain. Respiratory Negative Asthma, Chronic cough, Dyspnea, Pleuritic pain and Wheezing. Cardio Negative Chest pain, Edema and Irregular heartbeat/palpitations. GI Negative Abdominal pain, Change in bowel habits, Constipation, Decreased appetite, Diarrhea, Dysphagia, Heartburn, Hematemesis, Hematochezia, Melena, Nausea, Reflux and Vomiting.  Negative Dysuria, Hematuria, Urinary frequency, Urinary incontinence and Urinary retention. Endocrine Negative Cold intolerance, Gynecomastia, Heat intolerance and Increased thirst.   Neuro Negative Dizziness, Headache, Numbness, Tremors and Vertigo. Psych Negative Anxiety, Depression and Increased stress. Integumentary Negative Hives, Pruritus and Rash. MS Negative Back pain, Joint pain and Myalgia.    Hema/Lymph Negative Easy bleeding, Easy bruising and Lymphadenopathy. Allergic/Immuno Negative Chemicals in work place, Contact allergy, Food allergies, Immunosuppression and Seasonal allergies. Reproductive Negative Penile discharge and Sexual dysfunction. Vital Signs   Height  Time ft in cm Last Measured Height Position   11:18 AM 5.0 11.00 180.34 09/15/2021 Standing   11:13 AM    02/24/2022      Weight/BSA/BMI  Time lb oz kg Context BMI kg/m2 BSA m2   11:18 .00  78.471 dressed with shoes 24.13      Date/Time Temp Pulse BP Arterial Line 1 BP (mmHg) BP Patient Position Resp SpO2 O2 Device O2 Flow Rate (L/min) Level of Consciousness MEWS Score Weight   06/13/22 1050 -- 71 124/63 -- -- 16 100 % None (Room air) -- Alert (0) -- --   06/13/22 1045 -- 72 133/70 -- -- 16 100 % None (Room air) -- Alert (0) -- --   06/13/22 1040 -- 68 138/78 -- -- 16 95 % None (Room air) -- Alert (0) -- --   06/13/22 1035 -- 71 128/72 -- -- 16 99 % None (Room air) -- Alert (0) -- --   06/13/22 1018 97.9 °F (36.6 °C) 79 118/48 Abnormal  -- -- 16 96 % None (Room air) -- Alert (0) 1 74.3 kg (163 lb 12.8 oz)       Physical  Exam  Exam Findings Details   Constitutional * Nourishment - thin. Constitutional Comments limps on the right side   Constitutional Normal No acute distress. Well developed. Eyes Normal General - Right: Normal, Left: Normal. Conjunctiva - Right: Normal, Left: Normal. Sclera - Right: Normal, Left: Normal. Cornea - Right: Normal, Left: Normal. Pupil - Right: Normal, Left: Normal.   Nose/Mouth/Throat Normal Lips/teeth/gums - Normal. Tongue - Normal. Buccal mucosa - Normal. Palate & uvula - Normal.   Neck Exam Normal Inspection - Normal. Palpation - Normal. Thyroid gland - Normal. Cervical lymph nodes - Normal.   Respiratory Normal Inspection - Normal. Auscultation - Normal. Percussion - Normal. Cough - Absent. Effort - Normal.   Cardiovascular Normal Heart rate - Regular rate. Heart sounds - Normal S1, Normal S2. Murmurs - None. Extremities - No edema. Abdomen * Inspection - scaphoid abdomen, inverted V shaped epigastric, surgical scar(s). Abdomen Normal Patient is not obese. Appliance(s) - None. Abdominal muscles - Normal. Auscultation - Normal. Percussion - Normal. Anterior palpation - Normal, No guarding, No rebound. CVA tenderness - None. Umbilicus - Normal. Abdominal reflexes - Normal. No abdominal tenderness. No hepatic enlargement. No spleen enlargement. No hernia. No ascites. No palpable mass. Price's sign - Normal.   Skin Normal Inspection - Normal.   Musculoskeletal Normal Hands - Left: Normal, Right: Normal.   Extremity Normal No cyanosis. No edema. Clubbing - Absent. Neurological Normal Level of consciousness - Normal. Orientation - Normal. Memory - Normal. Motor - Normal. Balance & gait - Normal. Coordination - Normal. Fine motor skills - Normal. DTRs - Normal.   Psychiatric Normal Orientation - Oriented to time, place, person & situation. Not anxious. Appropriate mood and affect. Behavior appropriate for age. Sufficient language. No memory loss.    Immunizations Entered by History  Date Immunization   12/5/2021 12:00:00 AM SARS-COV-2 (COVID-19) vaccine, mRNA, spike protein, LNP, preservative free, 30 mcg/0.3mL dose   3/1/2017 12:00:00 AM Shingrix   1/1/2017 12:00:00 AM Shingrix   4/2/2021 12:00:00 AM SARS-COV-2 (COVID-19) vaccine, mRNA, spike protein, LNP, preservative free, 30 mcg/0.3mL dose   3/12/2021 12:00:00 AM SARS-COV-2 (COVID-19) vaccine, mRNA, spike protein, LNP, preservative free, 30 mcg/0.3mL dose   6/11/2013 12:00:00 AM tetanus toxoid, reduced diphtheria toxoid, and acellular pertussis vaccine, adsorbed   6/11/2013 12:00:00 AM pneumococcal polysaccharide vaccine, 23 valent       Active Patient Care Team Members  Name Contact Agency Type Support Role Relationship Active Date Inactive Date Specialty   Rina Record   Patient provider PCP   Internal Medicine   Joe Cardona   Emergency Contact Other      Sarina Galo   encounter provider    Gastroenterology     No change in H&P

## 2022-06-13 NOTE — PROCEDURES
(EGD) Esophagogastroduodenoscopy (UPPER ENDOSCOPY) Procedure Note  [facility] [place]  __________________________________________________________________________________________________________________________      2022     Patient: Zora Quinones YOB: 1950 Gender: male Age: 73 y.o. INDICATION:  he had liver transplant in 2017 for hep c. presently cured. 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 he felt better with Gabapentin. he had bleeding esophageal varices. he had 25 banding procedure felt better disappeared x 2 weeks because of stress reduction as he was severely stressed before. every where but last tuesday it came back progressively got worse. last night he took Gabapentin from her mother's and made him feel better now taking it twice bid for 6 months. he has recent severe GERD x 6 months he been on omeprazole 20 and then moved to 40 mg for 12 months and working well. He has dysphagia to solids for one a year mostly to dry rice and meat. he had to be very careful. he has regurgitation lately if he has been getting severe belching after a large meal. weight has been stable. 170 lbs. no smoke or drink. he was alcoholic and drug addict but stopped in . he has 2 bm / week with fiber. he had a negative colonoscopy in 2017 by Dr Olamide Henriquez. he doesn't eat much only one or at most 2 small meals per day. Aunt had brain cancer. father  from heart. mother had multiple CVA  he had lumbar spinal fusion in . he takes occasional pain killer. no NSAID's  he has gout in his right foot and ankle every 6 weeks he gets an attack. on Colchicine he is limping  so for sure we need to do an EGD soon preferably under mac because of hx of tolerance.   he has probably severe scarring in his esophagus from banding and chronic GERD but we need to r/o ca? ? : Nohelia Mcgraw MD    Referring Provider:  Luis Manuel Tom DO    Sedation:  Versed 5 mg IV, Fentanyl 100 mcg IV,    Procedure Details:  After infomed consent was obtained for the procedure, with all risks and benefits of procedure explained to  the patient. He was taken to the endoscopy suite and placed in the left lateral decubitus position. Following sequential administration of sedation as per above, the endoscope was inserted into the mouth and advanced under direct vision to fourth portion of the duodenum. A careful inspection was made as the gastroscope was withdrawn, including a retroflexed view of the proximal stomach; findings and interventions are described below. OROPHARYNX: The vocal Cords and the larynx are normal.   ESOPHAGUS: The proximal and mid oesophagus are normal. The distal esophagus is more tortuous with presence of deflated small varices in the distal esophagus but no visible esophageal stenosis. The Z-Line is slightly irregular. No Hiatal hernia. Diaphragmatic opening or notch is located at 45 cm. Empiric esophageal dilatation is made with Rooney bougie # 50 Fr which passed without difficulty or trauma on repeat endoscopy. STOMACH: No evidence of blood, fluid or solid food retention. The fundus on antegrade and retroflex views is normal. The cardia, body, lesser curvature, greater curvature, the antrum, and pylorus are normal. The gastric mucosa is normal. There is mild prepyloric deformity making it difficult to advance deeply in the duodenum  DUODENUM: The bulb, second, third, fourth portions and area of the major papilla are normal. There is possibly a possibility of early flat adenoma in the immediate post bulbar area. PROXIMAL JEJUNUM:  Not examined. Therapies:  Esophageal dilatation is made with Rooney bougie # 48 Fr    Specimen: none           Complications:   None    EBL:  Nil.   IMPLANTS: * No surgical log found *  IMPRESSION: The distal esophagus is more tortuous with presence of deflated small varices but no visible esophageal stenosis or major spasm. The Z-Line is slightly irregular. No Hiatal hernia. Diaphragmatic opening or notch is located at 45 cm. Empiric esophageal dilatation is made with Rooney bougie # 50 Fr which passed without difficulty or trauma on repeat endoscopy. The stomach is slightly large but No evidence of blood, fluid or solid food retention. There is mild prepyloric deformity making it slightly difficult to advance deeply in the duodenum. There is possibly a possibility of early flat adenoma?  in the immediate post bulbar area. RECOMMENDATION:  May resume antireflux diet. Avoid NSAID's. Make a FU appointment at the office. continue taking the Omeprazole but may want to reduce the dose to 20 mg daily. Reduce the gabapentin to once daily and then try to stop gradually. If the Hiccups resurface then we need to do a ct scan of the abdomen. Repeat the EGD in 5 years.     Assistant: None    --Delphine Machado MD on 6/13/2022 at 10:02 AM

## 2024-03-06 ENCOUNTER — HOSPITAL ENCOUNTER (OUTPATIENT)
Facility: HOSPITAL | Age: 74
Setting detail: SPECIMEN
Discharge: HOME | End: 2024-03-09

## 2024-03-06 LAB
ANION GAP SERPL CALC-SCNC: 6 MMOL/L (ref 3–18)
BASOPHILS # BLD: 0.1 K/UL (ref 0–0.1)
BASOPHILS NFR BLD: 1 % (ref 0–2)
BUN SERPL-MCNC: 19 MG/DL (ref 7–18)
CALCIUM SERPL-MCNC: 9.5 MG/DL (ref 8.5–10.1)
CHLORIDE SERPL-SCNC: 108 MMOL/L (ref 100–111)
CO2 SERPL-SCNC: 28 MMOL/L (ref 21–32)
CREAT SERPL-MCNC: 1.42 MG/DL (ref 0.6–1.3)
DIFFERENTIAL METHOD BLD: ABNORMAL
EOSINOPHIL # BLD: 0.1 K/UL (ref 0–0.4)
EOSINOPHIL NFR BLD: 2 % (ref 0–5)
ERYTHROCYTE [DISTWIDTH] IN BLOOD BY AUTOMATED COUNT: 16.1 % (ref 11.6–14.5)
GLUCOSE SERPL-MCNC: 99 MG/DL (ref 74–99)
HCT VFR BLD AUTO: 40.8 % (ref 36–48)
HGB BLD-MCNC: 12.2 G/DL (ref 13–16)
IMM GRANULOCYTES # BLD AUTO: 0 K/UL (ref 0–0.04)
INR PPP: 0.9 (ref 0.9–1.1)
LABCORP SPECIMEN COLLECTION: NORMAL
LYMPHOCYTES # BLD: 0.9 K/UL (ref 0.9–3.6)
MAGNESIUM SERPL-MCNC: 1.7 MG/DL (ref 1.6–2.6)
MCH RBC QN AUTO: 27.4 PG (ref 24–34)
MCHC RBC AUTO-ENTMCNC: 29.9 G/DL (ref 31–37)
MCV RBC AUTO: 91.7 FL (ref 78–100)
MONOCYTES # BLD: 0.5 K/UL (ref 0.05–1.2)
NEUTS SEG # BLD: 3.1 K/UL (ref 1.8–8)
NEUTS SEG NFR BLD: 67 % (ref 40–73)
NRBC # BLD: 0 K/UL (ref 0–0.01)
NRBC BLD-RTO: 0 PER 100 WBC
PLATELET # BLD AUTO: 231 K/UL (ref 135–420)
PMV BLD AUTO: 12 FL (ref 9.2–11.8)
POTASSIUM SERPL-SCNC: 4 MMOL/L (ref 3.5–5.5)
PROTHROMBIN TIME: 12.5 SEC (ref 11.9–14.7)
RBC # BLD AUTO: 4.45 M/UL (ref 4.35–5.65)
SODIUM SERPL-SCNC: 142 MMOL/L (ref 136–145)

## 2024-03-06 PROCEDURE — 83735 ASSAY OF MAGNESIUM: CPT

## 2024-03-06 PROCEDURE — 80048 BASIC METABOLIC PNL TOTAL CA: CPT

## 2024-03-06 PROCEDURE — 85025 COMPLETE CBC W/AUTO DIFF WBC: CPT

## 2024-03-06 PROCEDURE — 85610 PROTHROMBIN TIME: CPT

## 2024-11-20 NOTE — PERIOP NOTE
Attempted to call patient to perform pat screening call, number listed as reference number does not connect to dial tone when call placed. Scheduling sheet does not have any alternate number listed. Re--attempt made no answer , left voicemail as to reason for call and no need to return call as we will obtain info the date of procedure

## 2024-11-21 ENCOUNTER — ANESTHESIA EVENT (OUTPATIENT)
Facility: HOSPITAL | Age: 74
End: 2024-11-21
Payer: MEDICARE

## 2024-11-22 ENCOUNTER — ANESTHESIA (OUTPATIENT)
Facility: HOSPITAL | Age: 74
End: 2024-11-22
Payer: MEDICARE

## 2024-11-22 ENCOUNTER — HOSPITAL ENCOUNTER (OUTPATIENT)
Facility: HOSPITAL | Age: 74
Setting detail: OUTPATIENT SURGERY
Discharge: HOME | End: 2024-11-22
Attending: SURGERY | Admitting: SURGERY
Payer: MEDICARE

## 2024-11-22 VITALS
WEIGHT: 168 LBS | OXYGEN SATURATION: 100 % | RESPIRATION RATE: 18 BRPM | SYSTOLIC BLOOD PRESSURE: 137 MMHG | HEIGHT: 71 IN | TEMPERATURE: 97.8 F | DIASTOLIC BLOOD PRESSURE: 81 MMHG | HEART RATE: 76 BPM | BODY MASS INDEX: 23.52 KG/M2

## 2024-11-22 PROCEDURE — 2580000003 HC RX 258: Performed by: ANESTHESIOLOGY

## 2024-11-22 PROCEDURE — 3600007512: Performed by: SURGERY

## 2024-11-22 PROCEDURE — 3700000000 HC ANESTHESIA ATTENDED CARE: Performed by: SURGERY

## 2024-11-22 PROCEDURE — 7100000010 HC PHASE II RECOVERY - FIRST 15 MIN: Performed by: SURGERY

## 2024-11-22 PROCEDURE — 3700000001 HC ADD 15 MINUTES (ANESTHESIA): Performed by: SURGERY

## 2024-11-22 PROCEDURE — 2709999900 HC NON-CHARGEABLE SUPPLY: Performed by: SURGERY

## 2024-11-22 PROCEDURE — 7100000011 HC PHASE II RECOVERY - ADDTL 15 MIN: Performed by: SURGERY

## 2024-11-22 PROCEDURE — 3600007502: Performed by: SURGERY

## 2024-11-22 PROCEDURE — 6360000002 HC RX W HCPCS: Performed by: NURSE ANESTHETIST, CERTIFIED REGISTERED

## 2024-11-22 RX ORDER — LIDOCAINE HYDROCHLORIDE 20 MG/ML
INJECTION, SOLUTION EPIDURAL; INFILTRATION; INTRACAUDAL; PERINEURAL
Status: DISCONTINUED | OUTPATIENT
Start: 2024-11-22 | End: 2024-11-22 | Stop reason: SDUPTHER

## 2024-11-22 RX ORDER — NALOXONE HYDROCHLORIDE 0.4 MG/ML
INJECTION, SOLUTION INTRAMUSCULAR; INTRAVENOUS; SUBCUTANEOUS PRN
Status: DISCONTINUED | OUTPATIENT
Start: 2024-11-22 | End: 2024-11-22 | Stop reason: HOSPADM

## 2024-11-22 RX ORDER — CALCIUM CARBONATE 300MG(750)
400 TABLET,CHEWABLE ORAL DAILY
COMMUNITY

## 2024-11-22 RX ORDER — LABETALOL HYDROCHLORIDE 5 MG/ML
10 INJECTION, SOLUTION INTRAVENOUS
Status: DISCONTINUED | OUTPATIENT
Start: 2024-11-22 | End: 2024-11-22 | Stop reason: HOSPADM

## 2024-11-22 RX ORDER — PROPOFOL 10 MG/ML
INJECTION, EMULSION INTRAVENOUS
Status: DISCONTINUED | OUTPATIENT
Start: 2024-11-22 | End: 2024-11-22 | Stop reason: SDUPTHER

## 2024-11-22 RX ORDER — SODIUM CHLORIDE 0.9 % (FLUSH) 0.9 %
5-40 SYRINGE (ML) INJECTION PRN
Status: DISCONTINUED | OUTPATIENT
Start: 2024-11-22 | End: 2024-11-22 | Stop reason: HOSPADM

## 2024-11-22 RX ORDER — ASCORBATE CALCIUM 500 MG
1000 TABLET ORAL DAILY
COMMUNITY

## 2024-11-22 RX ORDER — DIPHENHYDRAMINE HYDROCHLORIDE 50 MG/ML
12.5 INJECTION INTRAMUSCULAR; INTRAVENOUS
Status: DISCONTINUED | OUTPATIENT
Start: 2024-11-22 | End: 2024-11-22 | Stop reason: HOSPADM

## 2024-11-22 RX ORDER — SODIUM CHLORIDE 0.9 % (FLUSH) 0.9 %
5-40 SYRINGE (ML) INJECTION EVERY 12 HOURS SCHEDULED
Status: DISCONTINUED | OUTPATIENT
Start: 2024-11-22 | End: 2024-11-22 | Stop reason: HOSPADM

## 2024-11-22 RX ORDER — ONDANSETRON 2 MG/ML
4 INJECTION INTRAMUSCULAR; INTRAVENOUS
Status: DISCONTINUED | OUTPATIENT
Start: 2024-11-22 | End: 2024-11-22 | Stop reason: HOSPADM

## 2024-11-22 RX ORDER — SODIUM CHLORIDE, SODIUM LACTATE, POTASSIUM CHLORIDE, CALCIUM CHLORIDE 600; 310; 30; 20 MG/100ML; MG/100ML; MG/100ML; MG/100ML
INJECTION, SOLUTION INTRAVENOUS CONTINUOUS
Status: DISCONTINUED | OUTPATIENT
Start: 2024-11-22 | End: 2024-11-22 | Stop reason: HOSPADM

## 2024-11-22 RX ORDER — IPRATROPIUM BROMIDE AND ALBUTEROL SULFATE 2.5; .5 MG/3ML; MG/3ML
1 SOLUTION RESPIRATORY (INHALATION)
Status: DISCONTINUED | OUTPATIENT
Start: 2024-11-22 | End: 2024-11-22 | Stop reason: HOSPADM

## 2024-11-22 RX ORDER — SODIUM CHLORIDE 9 MG/ML
INJECTION, SOLUTION INTRAVENOUS PRN
Status: DISCONTINUED | OUTPATIENT
Start: 2024-11-22 | End: 2024-11-22 | Stop reason: HOSPADM

## 2024-11-22 RX ADMIN — SODIUM CHLORIDE 100 ML/HR: 9 INJECTION, SOLUTION INTRAVENOUS at 08:32

## 2024-11-22 RX ADMIN — PROPOFOL 20 MG: 10 INJECTION, EMULSION INTRAVENOUS at 09:36

## 2024-11-22 RX ADMIN — PROPOFOL 20 MG: 10 INJECTION, EMULSION INTRAVENOUS at 09:30

## 2024-11-22 RX ADMIN — PROPOFOL 20 MG: 10 INJECTION, EMULSION INTRAVENOUS at 09:41

## 2024-11-22 RX ADMIN — PROPOFOL 30 MG: 10 INJECTION, EMULSION INTRAVENOUS at 09:27

## 2024-11-22 RX ADMIN — PROPOFOL 20 MG: 10 INJECTION, EMULSION INTRAVENOUS at 09:31

## 2024-11-22 RX ADMIN — PROPOFOL 20 MG: 10 INJECTION, EMULSION INTRAVENOUS at 09:29

## 2024-11-22 RX ADMIN — PROPOFOL 20 MG: 10 INJECTION, EMULSION INTRAVENOUS at 09:43

## 2024-11-22 RX ADMIN — PROPOFOL 40 MG: 10 INJECTION, EMULSION INTRAVENOUS at 09:25

## 2024-11-22 RX ADMIN — LIDOCAINE HYDROCHLORIDE 50 MG: 20 INJECTION, SOLUTION EPIDURAL; INFILTRATION; INTRACAUDAL; PERINEURAL at 09:25

## 2024-11-22 RX ADMIN — PROPOFOL 20 MG: 10 INJECTION, EMULSION INTRAVENOUS at 09:45

## 2024-11-22 RX ADMIN — PROPOFOL 20 MG: 10 INJECTION, EMULSION INTRAVENOUS at 09:34

## 2024-11-22 RX ADMIN — PROPOFOL 20 MG: 10 INJECTION, EMULSION INTRAVENOUS at 09:38

## 2024-11-22 RX ADMIN — PROPOFOL 20 MG: 10 INJECTION, EMULSION INTRAVENOUS at 09:39

## 2024-11-22 ASSESSMENT — PAIN - FUNCTIONAL ASSESSMENT
PAIN_FUNCTIONAL_ASSESSMENT: NONE - DENIES PAIN
PAIN_FUNCTIONAL_ASSESSMENT: NONE - DENIES PAIN
PAIN_FUNCTIONAL_ASSESSMENT: 0-10

## 2024-11-22 NOTE — INTERVAL H&P NOTE
Update History & Physical    The patient's History and Physical of November 21, 2024 was reviewed with the patient and I examined the patient. There was no change. The surgical site was confirmed by the patient and me.     Plan: The risks, benefits, expected outcome, and alternative to the recommended procedure have been discussed with the patient. Patient understands and wants to proceed with the procedure.     Electronically signed by Cathy Patel DO on 11/22/2024 at 9:17 AM

## 2024-11-22 NOTE — ANESTHESIA POSTPROCEDURE EVALUATION
Department of Anesthesiology  Postprocedure Note    Patient: Lucio Hall  MRN: [id number]  YOB: 1950  Date of evaluation: 11/22/2024    Procedure Summary       Date: 11/22/24 Room / Location: [facility] ENDO 01 / [facility] ENDOSCOPY    Anesthesia Start: 0918 Anesthesia Stop: 0955    Procedure: COLONOSCOPY  WITH POSSIBLE BIOPSIES OR OTHER THERAPIES (Lower GI Region) Diagnosis:       Special screening for malignant neoplasms, colon      (Special screening for malignant neoplasms, colon [Z12.11])    Surgeons: Cathy Patel DO Responsible Provider: Jonatan Diop MD    Anesthesia Type: MAC ASA Status: 3            Anesthesia Type: MAC    Jackson Phase I: Jackson Score: 10    Jackson Phase II: Jackson Score: 9    Anesthesia Post Evaluation    Comments: Post-Anesthesia Evaluation and Assessment    Cardiovascular Function/Vital Signs/Pain Score  Vitals  BP: 137/81  Temp: 97.8 °F (36.6 °C)  Temp Source: Oral  Pulse: 76  Respirations: 18  SpO2: 100 %  Height: 180.3 cm (5' 11\")  Weight - Scale: 76.2 kg (168 lb)  Pain Level: 0     Patient is status post Procedure(s):  COLONOSCOPY  WITH POSSIBLE BIOPSIES OR OTHER THERAPIES.    Nausea/Vomiting: Controlled.    Postoperative hydration reviewed and adequate.    Pain:  Managed.    Neurological Status:   At baseline.    Mental Status and Level of Consciousness: Arousable.    Pulmonary Status:   Adequate oxygenation and airway patent.    Complications related to anesthesia: None    Post-anesthesia assessment completed. No concerns.    Patient has met all discharge requirements.    Signed By: Jonatan Diop MD    November 22, 2024     No notable events documented.

## 2024-11-22 NOTE — ANESTHESIA PRE PROCEDURE
Take 5 mg by mouth 3 times daily         Allergies:    Allergies   Allergen Reactions   • Lisinopril Swelling       Problem List:    Patient Active Problem List   Diagnosis Code   • Enlarged prostate with urinary obstruction N40.1, N13.8       Past Medical History:        Diagnosis Date   • Cancer (HCC)     liver - removed with transplant 2017   • GERD (gastroesophageal reflux disease)    • Hypertension    • Ill-defined condition     severe hiccups   • Liver disease     Hep C    • Psychiatric disorder     depression or anxiety       Past Surgical History:        Procedure Laterality Date   • NEUROLOGICAL SURGERY  09/2011    fusion with hardware    • OTHER SURGICAL HISTORY  2020    plastic surgery - \"eyes and neck\"   • TRANSPLANT  06/2017    liver       Social History:    Social History     Tobacco Use   • Smoking status: Never   • Smokeless tobacco: Never   Substance Use Topics   • Alcohol use: Not Currently                                Counseling given: Not Answered      Vital Signs (Current): There were no vitals filed for this visit.                                           BP Readings from Last 3 Encounters:   No data found for BP       NPO Status:                                                                                 BMI:   Wt Readings from Last 3 Encounters:   No data found for Wt     There is no height or weight on file to calculate BMI.    CBC:   Lab Results   Component Value Date/Time    WBC 4.7 03/06/2024 04:09 PM    RBC 4.45 03/06/2024 04:09 PM    HGB 12.2 03/06/2024 04:09 PM    HCT 40.8 03/06/2024 04:09 PM    MCV 91.7 03/06/2024 04:09 PM    RDW 16.1 03/06/2024 04:09 PM     03/06/2024 04:09 PM       CMP:   Lab Results   Component Value Date/Time     03/06/2024 04:09 PM    K 4.0 03/06/2024 04:09 PM     03/06/2024 04:09 PM    CO2 28 03/06/2024 04:09 PM    BUN 19 03/06/2024 04:09 PM    CREATININE 1.42 03/06/2024 04:09 PM    GFRAA 40 03/10/2021 10:44 AM    LABGLOM 53 03/06/2024

## 2024-11-22 NOTE — PROCEDURES
PROCEDURE NOTE  Date: 11/22/2024   Name: Lucio Hall  YOB: 1950      Colonoscopy Procedure Note    Indications: screening for colorectal cancer    Surgeon(s): Surgeons and Role:     * Cathy Patel DO - Primary    Assistant(s): Perioperative Nurse: Ethel Coker, RN  Endoscopy Technician: Chris Sarabia    Anesthesia: Monitor Anesthesia Care     Pre-Procedure Exam:  Airway: clear   Heart: normal S1and S2    Lungs: clear bilateral  Abdomen: soft, nontender, bowel sounds present and normal in all quadrants   Mental Status: awake, alert, and oriented to person, place, and time      Procedure in Detail:  Informed consent was obtained for the procedure, including sedation.  Risks of perforation, hemorrhage, adverse drug reaction, and aspiration were discussed. The patient was placed in the left lateral decubitus position.  Based on the pre-procedure assessment, including review of the patient's medical history, medications, allergies, and review of systems, he had been deemed to be an appropriate candidate for moderate sedation; he was therefore sedated with the medications listed above.   The patient was monitored continuously with ECG tracing, pulse oximetry, blood pressure monitoring, and direct observations.      A digital rectal examination was performed. The [id number] was inserted into the rectum and advanced under direct vision to the cecum, which was identified by the ileocecal valve and appendiceal orifice.  The quality of the colonic preparation was good.  A careful inspection of the mucosa was made as the colonoscope was withdrawn, including a careful and complete straight view of the rectum and anorectal junction; findings and interventions are described below.  Appropriate photodocumentation was obtained.    Findings:   Rectum: Normal  Sigmoid: Normal  Descending Colon: Normal  Transverse Colon: Normal  Ascending Colon: Normal  Cecum: Normal  Terminal Ileum: Not entered    Specimens: No

## 2024-11-22 NOTE — H&P
sment/Plan  # Detail Type Description    1. Assessment Encounter for screening for malignant neoplasm of colon (Z12.11).    Impression Currently patient in overall good health. Does have h/o liver transplant in 2017. Otherwise, no other cardiac, kidney or pulmonary concerns. Medications reviewed - not on blood thinners. Miralax bowel prep thoroughly reviewed and pamphlet provided with detailed instructions. Patient verbalized understanding..    Provider Plan I have discussed the risks, benefits and alternatives of the procedure to the patient including bleeding, infection, bowel prep, perforation missed lesions and incomplete procedure.  They understand and wish to proceed.         2. Assessment Body mass index (BMI) 25.0-25.9, adult (Z68.25).    Plan Orders Today's instructions / counseling include(s) Lifestyle education regarding diet.            This 73 year old patient presents for colon consult.    History of Present Illness  1.  Colon Cancer Screen   Prior screening:  colonoscopy.  Risk Factors: history of other malignancy.  Pertinent negatives include abdominal pain, change in bowel habits, change in stool caliber, constipation, decreased appetite, diarrhea, melena, nausea, rectal bleeding, vomiting, weight gain and weight loss.  Additional information: No family history of colon cancer, No family history of Crohn's/colitis, No NSAID/ASA use and H/O liver cancer - liver transplant 2017.            Medications (active prior to today)  Medication Name Sig Description Start Date Stop Date Refilled Rx Elsewhere   entecavir 0.5 mg tablet take 1 tablet by oral route  every other day. 08/24/2020   N   magnesium 200 mg tablet take 2 tablet by oral route  every day 07/20/2021 07/20/2021 N   Multiple Vitamins tablet take 1 tablet by oral route  every day with food 07/20/2021 07/20/2021 N   mycophenolate mofetil 250 mg capsule take 1 capsule by oral route 2 times every day 07/20/2021 07/20/2021 N   tacrolimus 0.5 mg    6/11/2013 12:00:00 AM tetanus toxoid, reduced diphtheria toxoid, and acellular pertussis vaccine, adsorbed   6/11/2013 12:00:00 AM pneumococcal polysaccharide vaccine, 23 valent     Medications (added, continued, or stopped this visit)  Start Date Medication Directions PRN Status PRN Reason Instruction Stop Date   08/24/2020 entecavir 0.5 mg tablet take 1 tablet by oral route  every other day. N      01/07/2024 furosemide 20 mg tablet take 1 tablet by oral route  every day N      07/08/2024 hydralazine 25 mg tablet take 1 tablet by oral route 2 times every day with food N      07/09/2024 ketoconazole 2 % shampoo apply by topical route  every day to the affected area(s), lather, leave in place for 5 minutes, and then rinse off with water N      07/20/2021 magnesium 200 mg tablet take 2 tablet by oral route  every day N      07/15/2024 METOPROLOL SUCC ER 25 MG TAB take 1 tablet by mouth once daily N      07/20/2021 Multiple Vitamins tablet take 1 tablet by oral route  every day with food N      01/07/2024 mupirocin 2 % topical ointment apply by topical route 3 times every day a small amount to the affected area N      07/20/2021 mycophenolate mofetil 250 mg capsule take 1 capsule by oral route 2 times every day N      07/02/2024 pantoprazole 40 mg tablet,delayed release take 1 tablet by oral route  every day N      01/04/2024 Skyrizi 150 mg/mL subcutaneous pen injector inject (150MG)  by subcutaneous route  every 12 weeks N      07/21/2021 tacrolimus 0.5 mg capsule, immediate-release take 1 microgram by oral route 2 times every day N      09/10/2021 Vitamin C 1,000 mg tablet  N      09/10/2021 Vitamin D3 125 mcg (5,000 unit) tablet  N

## 2024-11-22 NOTE — DISCHARGE INSTRUCTIONS
Lucio Hall  [id number]  1950    COLON DISCHARGE INSTRUCTIONS    Discomfort:  Redness at IV site- apply warm compress to area; if redness or soreness persist- contact your physician  There may be a slight amount of blood passed from the rectum  Gaseous discomfort- walking, belching will help relieve any discomfort  You should not operate a vehicle for 12 hours  You should not engage in an occupation involving machinery or appliances for rest of today  You may not drink alcoholic beverages for at least 12 hours  Avoid making any critical decisions for at least 24 hour  DIET:   Regular or resume diet normally appropriate for you.   - however -  remember your colon is empty and a heavy meal will produce gas.   Avoid these foods:  vegetables, fried / greasy foods, carbonated drinks for today     ACTIVITY:  You may resume your normal daily activities it is recommended that you spend the remainder of the day resting -  avoid any strenuous activity.    CALL M.D.  ANY SIGN OF:   Increasing pain, nausea, vomiting  Abdominal distension (swelling)  New increased bleeding (oral or rectal)  Fever (chills)  Pain in chest area  Bloody discharge from nose or mouth  Shortness of breath      Follow-up Instructions:   If biopsies were taken, please call Dr. Patel's office in next 2 weeks, [phone]. Otherwise follow up with your PCM.                        Lucio Hall  [id number]  1950          DISCHARGE SUMMARY from Nurse    PATIENT INSTRUCTIONS:    After general anesthesia or intravenous sedation, for 24 hours or while taking prescription Narcotics:  Limit your activities  Do not drive and operate hazardous machinery  Do not make important personal or business decisions  Do  not drink alcoholic beverages  If you have not urinated within 8 hours after discharge, please contact your surgeon on call.    Report the following to your surgeon:  Excessive pain, swelling, redness or odor of or around the surgical  area  Temperature over 100.5  Nausea and vomiting lasting longer than 4 hours or if unable to take medications  Any signs of decreased circulation or nerve impairment to extremity: change in color, persistent  numbness, tingling, coldness or increase pain  Any questions    What to do at Home:  Recommended activity: as above,     If you experience any of the following symptoms as above, please follow up with Dr. Patel.    *  Please give a list of your current medications to your Primary Care Provider.    *  Please update this list whenever your medications are discontinued, doses are      changed, or new medications (including over-the-counter products) are added.    *  Please carry medication information at all times in case of emergency situations.    These are general instructions for a healthy lifestyle:    No smoking/ No tobacco products/ Avoid exposure to second hand smoke  Surgeon General's Warning:  Quitting smoking now greatly reduces serious risk to your health.    Obesity, smoking, and sedentary lifestyle greatly increases your risk for illness    A healthy diet, regular physical exercise & weight monitoring are important for maintaining a healthy lifestyle    You may be retaining fluid if you have a history of heart failure or if you experience any of the following symptoms:  Weight gain of 3 pounds or more overnight or 5 pounds in a week, increased swelling in our hands or feet or shortness of breath while lying flat in bed.  Please call your doctor as soon as you notice any of these symptoms; do not wait until your next office visit.        The discharge information has been reviewed with the patient.  The patient verbalized understanding.  Discharge medications reviewed with the patient and appropriate educational materials and side effects teaching were provided.  _______________________________________________________________________________________________________________________Patient armband

## (undated) DEVICE — WRISTBAND ID AD W2.5XL9.5CM RED VYN ADH CLSR UNI-PRINT

## (undated) DEVICE — MAJ-1414 SINGLE USE ADPATER BIOPSY VALV: Brand: SINGLE USE ADAPTOR BIOPSY VALVE

## (undated) DEVICE — GARMENT,MEDLINE,DVT,SEQUENTIAL,CALF,MD: Brand: MEDLINE

## (undated) DEVICE — SYRINGE 50ML E/T

## (undated) DEVICE — STRAP,POSITIONING,KNEE/BODY,FOAM,4X60": Brand: MEDLINE

## (undated) DEVICE — CYSTO PACK: Brand: MEDLINE INDUSTRIES, INC.

## (undated) DEVICE — SINGLE PORT MANIFOLD: Brand: NEPTUNE 2

## (undated) DEVICE — KENDALL RADIOLUCENT FOAM MONITORING ELECTRODE RECTANGULAR SHAPE: Brand: KENDALL

## (undated) DEVICE — BLUNTFILL: Brand: MONOJECT

## (undated) DEVICE — SOLUTION IV 500ML 0.9% SOD CHL FLX CONT

## (undated) DEVICE — Device

## (undated) DEVICE — TUBING, SUCTION, 1/4" X 12', STRAIGHT: Brand: MEDLINE

## (undated) DEVICE — 4-PORT MANIFOLD: Brand: NEPTUNE 2

## (undated) DEVICE — MOUTHPIECE ENDOSCP 20X27MM --

## (undated) DEVICE — SOLUTION IRRIG 3000ML 0.9% SOD CHL FLX CONT 0797208] ICU MEDICAL INC]

## (undated) DEVICE — SPONGE GZ W4XL4IN COT 12 PLY TYP VII WVN C FLD DSGN

## (undated) DEVICE — CATH URETH FOL 2W MED 20FRX5 --

## (undated) DEVICE — TRAP SPEC COLL POLYP POLYSTYR --

## (undated) DEVICE — CANNULA CUSH AD W/ 14FT TBG

## (undated) DEVICE — SYRINGE MED 5ML STD CLR PLAS LUERLOCK TIP N CTRL DISP

## (undated) DEVICE — TOURNIQUET PHLEB W1XL18IN BLU FLAT RL AND BND REUSE FOR IV

## (undated) DEVICE — GARMENT,MEDLINE,DVT,INT,CALF,MED, GEN2: Brand: MEDLINE

## (undated) DEVICE — CATHETER IV 22GA L1IN BLU POLYUR STR HUB RADPQ PROTCT +

## (undated) DEVICE — CATHETER PH SUCT 14FR

## (undated) DEVICE — Z INACTIVE USE 2527070 DRAPE SURG W40XL44IN UNDERBUTTOCK SMS POLYPR W/ PCH BK DISP

## (undated) DEVICE — REM POLYHESIVE ADULT PATIENT RETURN ELECTRODE: Brand: VALLEYLAB

## (undated) DEVICE — SOLUTION IRRIGATION H2O 0797305] ICU MEDICAL INC]

## (undated) DEVICE — BAG URIN LEG DISPOZ-A-BG 19OZ -- W/18IN EXT TUBING

## (undated) DEVICE — STERILE LATEX POWDER-FREE SURGICAL GLOVESWITH NITRILE COATING: Brand: PROTEXIS

## (undated) DEVICE — REZUM DELIVERY DEVICE